# Patient Record
Sex: FEMALE | Race: WHITE | NOT HISPANIC OR LATINO | Employment: OTHER | URBAN - METROPOLITAN AREA
[De-identification: names, ages, dates, MRNs, and addresses within clinical notes are randomized per-mention and may not be internally consistent; named-entity substitution may affect disease eponyms.]

---

## 2018-05-11 NOTE — PATIENT DISCUSSION
Patient educated with [employer] [facility] for near, will need glasses for all intermediate and distance activities. Patient educated there is a 10% chance of needing enhancement after surgery. Patient elects [facility] OD, goal of -2.00.

## 2021-11-29 ENCOUNTER — PREPPED CHART (OUTPATIENT)
Dept: URBAN - METROPOLITAN AREA CLINIC 20 | Facility: CLINIC | Age: 41
End: 2021-11-29

## 2021-11-29 NOTE — PATIENT DISCUSSION
prev noted isaiah now well resolved, mild redness/edema remaining. G1 MGD, improved. d/c tobradex oph eduar at this time. cont hot compresses/ocusoft as maintenance tx. monitor routinely. call/RTC sooner PRN.

## 2022-02-11 ASSESSMENT — TONOMETRY
OS_IOP_MMHG: 13
OD_IOP_MMHG: 13

## 2022-02-11 ASSESSMENT — VISUAL ACUITY
OD_CC: 20/20
OS_CC: 20/20

## 2022-02-18 ENCOUNTER — ESTABLISHED PATIENT (OUTPATIENT)
Dept: URBAN - METROPOLITAN AREA CLINIC 19 | Facility: CLINIC | Age: 42
End: 2022-02-18

## 2022-02-18 DIAGNOSIS — E11.9: ICD-10-CM

## 2022-02-18 DIAGNOSIS — H04.123: ICD-10-CM

## 2022-02-18 DIAGNOSIS — H52.13: ICD-10-CM

## 2022-02-18 PROCEDURE — 92014 COMPRE OPH EXAM EST PT 1/>: CPT

## 2022-02-18 PROCEDURE — 92310A CONTACT LENS 50

## 2022-02-18 ASSESSMENT — VISUAL ACUITY
OU_CC: 20/20
OU_CC: 20/20-1
OD_CC: 20/20
OS_CC: 20/20

## 2022-02-18 ASSESSMENT — TONOMETRY
OD_IOP_MMHG: 17
OS_IOP_MMHG: 16

## 2022-02-18 ASSESSMENT — KERATOMETRY
OS_AXISANGLE_DEGREES: 100
OS_AXISANGLE2_DEGREES: 10
OD_K1POWER_DIOPTERS: 46.00
OS_K2POWER_DIOPTERS: 46.75
OD_AXISANGLE2_DEGREES: 15
OD_AXISANGLE_DEGREES: 105
OD_K2POWER_DIOPTERS: 46.50
OS_K1POWER_DIOPTERS: 45.75

## 2022-02-18 NOTE — PATIENT DISCUSSION
lid scrubs bid Ou with warm compresses after.  Claritin is drying her out as well.  may need dry eye work up.

## 2024-02-27 ENCOUNTER — ESTABLISHED PATIENT (OUTPATIENT)
Dept: URBAN - METROPOLITAN AREA CLINIC 19 | Facility: CLINIC | Age: 44
End: 2024-02-27

## 2024-02-27 DIAGNOSIS — H52.13: ICD-10-CM

## 2024-02-27 DIAGNOSIS — H04.123: ICD-10-CM

## 2024-02-27 DIAGNOSIS — E11.9: ICD-10-CM

## 2024-02-27 PROCEDURE — 92015 DETERMINE REFRACTIVE STATE: CPT

## 2024-02-27 PROCEDURE — 92012 INTRM OPH EXAM EST PATIENT: CPT

## 2024-02-27 ASSESSMENT — VISUAL ACUITY
OU_CC: 20/20
OS_CC: 20/25-1
OD_CC: 20/25+3

## 2024-02-27 ASSESSMENT — KERATOMETRY
OS_AXISANGLE_DEGREES: 175
OD_K2POWER_DIOPTERS: 46.25
OD_AXISANGLE2_DEGREES: 113
OS_K1POWER_DIOPTERS: 47.00
OD_AXISANGLE_DEGREES: 23
OS_K2POWER_DIOPTERS: 46.25
OD_K1POWER_DIOPTERS: 46.75
OS_AXISANGLE2_DEGREES: 85

## 2024-02-27 ASSESSMENT — TONOMETRY
OD_IOP_MMHG: 9
OS_IOP_MMHG: 17